# Patient Record
Sex: FEMALE | URBAN - METROPOLITAN AREA
[De-identification: names, ages, dates, MRNs, and addresses within clinical notes are randomized per-mention and may not be internally consistent; named-entity substitution may affect disease eponyms.]

---

## 2020-01-28 ENCOUNTER — TELEPHONE (OUTPATIENT)
Dept: TRANSPLANT | Facility: CLINIC | Age: 25
End: 2020-01-28

## 2020-01-28 NOTE — TELEPHONE ENCOUNTER
"Close Window   Print This Page   Expand All  Collapse All  [facility] BREEZE  [id number]      LIVING KIDNEY DONOR EVALUATION  Donor First Name Ernestina Donor MRN    Donor Last Name Vero Yin Completed 2020 5:37 PM    1995 Record ID [id number]   BREEZE Screen PASSED     Intended Recipient  Recipient First Name Cristi Recipient MRN    Recipient Last Name Annamaria Mcclendon Relationship Cousin   Recipient  1989 Recipient Diagnosis    Recipient's ABO      Donor Information  Age 24 Gender Female   Ht 163 cm (5' 4'') Race Not Specified   Wt 79.3 kg (175 lbs) Ethnicity /   BMI 30.00 kg/m  Preferred Language English      Required No     Blood Type Unknown   Demographics  Home Address [facility] [address] [facility] # [phone]   [facility] Type [place]   State [place] Alternate #    Zip Code [address] Type    Country [place] Preferred Contact day Mon, Wed, Tue   Email [email or web address] Preferred Contact time 11:00 AM-1:00 PM, 09:00 AM-11:00 AM   &&   Donor's Medical Information  Medical History History of pregnancy   Low Back Pain   Ovarian Cysts Medications None Reported   Surgical History None Reported Allergies NKDA   Social History EtOH: Rare (1-2 drinks/month)   Illicit Drug Use: Denies   Tobacco: Denies Self-Reported Functional Status \"I am able to participate in moderate recreational activities like golf, double tennis, dancing, throwing a baseball or football\"   Family Medical History Cancer (denies)   Diabetes (denies)   Heart Disease (denies)   Hypertension (denies)   Kidney Disease (denies)   Kidney Stones (denies) Exercise Frequency Exercise (Not on a regular basis)   Review of Organ Systems  Review of Systems Airway or Lungs: No   Blood Disorder: No   Cancer: No   Diabetes,Thyroid,Adrenal,Endocrine Disorder: No   Digestive or Liver: No   Female Health: Yes   Heart or Circulatory System: No   Immune Diseases: No   Kidneys and Bladder: No "   Muscles,Bones,Joints: Yes   Neuro: No   Psych: No   &&   Donor's Social Information  Marital Status  Living Accommodation Lives in rented accommodation   Level of Education High school or secondary school degree complete Living Arrangement With spouse   Employment Status Unemployed Concerns: health and life insurance No   Employer  Concerns: job security and lost income No   Occupation      Medical Insurance Status No medical insurance     High Risk Behavior  High Risk Behaviors Blood transfusion < 12 months. (NO)   Commercial sex < 12 months. (NO)   Illicit IV drug use < 5yrs. (NO)   Other high risk sexual contact < 12 months. (NO)   Reason for Donation  Referral Tx Candidate Reason for Donation It s a way I can help.   Permission to Disclose Inquiry No Patient Comments    Donor Motivation Level Ready to start evaluation with reservations     PCP Contact  PCP Name    PCP [facility]    PCP State    PCP Phone    Emergency Contact  First Name Naun First Name Paula   Last Name Elias Last Name Annamaria   Phone # [phone] Phone # [phone]   Phone Type Mobile Phone Type Mobile   Relationship Spouse Relationship Mother   Office Use  Reviewed By    Reviewed 1/28/2020 11:46 AM   Admin Folder Archive   Comments    Lost for Followup    Extended Comments    BREEZE ID [email or web address]:[id number] survey status completed   Activity History  Call  Task    Due Date 1/27/2020   Last Modified Date/Time 1/27/2020 10:48 AM   Comments

## 2020-01-30 DIAGNOSIS — Z00.5 TRANSPLANT DONOR EVALUATION: Primary | ICD-10-CM
